# Patient Record
Sex: MALE | Race: WHITE | NOT HISPANIC OR LATINO | Employment: FULL TIME | URBAN - METROPOLITAN AREA
[De-identification: names, ages, dates, MRNs, and addresses within clinical notes are randomized per-mention and may not be internally consistent; named-entity substitution may affect disease eponyms.]

---

## 2024-02-15 ENCOUNTER — TELEPHONE (OUTPATIENT)
Dept: GASTROENTEROLOGY | Facility: CLINIC | Age: 49
End: 2024-02-15

## 2024-02-15 NOTE — TELEPHONE ENCOUNTER
----- Message from Jenniffer Tomlin sent at 2/15/2024  9:02 AM CST -----  Regarding: Appt Sooner  Contact: [phone]  Damaso Morin calling regarding Appointment Access  (message) for # pt is calling to schedule appt with any provider that can see him soon. only appt was in [place] and pt did not want to schedule. please call pt to get schedule. Pt is coming in for blood in stool.